# Patient Record
Sex: MALE | Race: BLACK OR AFRICAN AMERICAN | NOT HISPANIC OR LATINO
[De-identification: names, ages, dates, MRNs, and addresses within clinical notes are randomized per-mention and may not be internally consistent; named-entity substitution may affect disease eponyms.]

---

## 2018-09-28 PROBLEM — Z00.00 ENCOUNTER FOR PREVENTIVE HEALTH EXAMINATION: Status: ACTIVE | Noted: 2018-09-28

## 2023-12-12 ENCOUNTER — APPOINTMENT (OUTPATIENT)
Dept: PAIN MANAGEMENT | Facility: CLINIC | Age: 78
End: 2023-12-12
Payer: COMMERCIAL

## 2023-12-12 ENCOUNTER — NON-APPOINTMENT (OUTPATIENT)
Age: 78
End: 2023-12-12

## 2023-12-12 VITALS
HEART RATE: 89 BPM | OXYGEN SATURATION: 99 % | BODY MASS INDEX: 25.05 KG/M2 | HEIGHT: 73 IN | SYSTOLIC BLOOD PRESSURE: 137 MMHG | DIASTOLIC BLOOD PRESSURE: 89 MMHG | WEIGHT: 189 LBS

## 2023-12-12 DIAGNOSIS — Z78.9 OTHER SPECIFIED HEALTH STATUS: ICD-10-CM

## 2023-12-12 DIAGNOSIS — N18.30 CHRONIC KIDNEY DISEASE, STAGE 3 UNSPECIFIED: ICD-10-CM

## 2023-12-12 DIAGNOSIS — E11.9 TYPE 2 DIABETES MELLITUS W/OUT COMPLICATIONS: ICD-10-CM

## 2023-12-12 DIAGNOSIS — I10 ESSENTIAL (PRIMARY) HYPERTENSION: ICD-10-CM

## 2023-12-12 DIAGNOSIS — E78.00 PURE HYPERCHOLESTEROLEMIA, UNSPECIFIED: ICD-10-CM

## 2023-12-12 PROCEDURE — 99204 OFFICE O/P NEW MOD 45 MIN: CPT

## 2023-12-28 ENCOUNTER — APPOINTMENT (OUTPATIENT)
Dept: PAIN MANAGEMENT | Facility: CLINIC | Age: 78
End: 2023-12-28
Payer: COMMERCIAL

## 2023-12-28 VITALS
WEIGHT: 189 LBS | HEIGHT: 73 IN | DIASTOLIC BLOOD PRESSURE: 75 MMHG | HEART RATE: 92 BPM | SYSTOLIC BLOOD PRESSURE: 140 MMHG | RESPIRATION RATE: 16 BRPM | BODY MASS INDEX: 25.05 KG/M2 | OXYGEN SATURATION: 97 %

## 2023-12-28 PROCEDURE — 64484 NJX AA&/STRD TFRM EPI L/S EA: CPT | Mod: 50

## 2023-12-28 PROCEDURE — 64483 NJX AA&/STRD TFRM EPI L/S 1: CPT | Mod: 50

## 2023-12-28 NOTE — PROCEDURE
[FreeTextEntry1] : PROCEDURE: Bilateral L5-S1 transforaminal epidural steroid injection under fluoroscopic guidance.  CHIEF COMPLAINT: Low back and leg pain.    PREOPERATIVE DIAGNOSIS:	Lumbar radiculopathy.  POSTOPERATIVE DIAGNOSIS:  Lumbar radiculopathy. 	  ANESTHESIA:  Local.  COMPLICATIONS:  	None.  ESTIMATED BLOOD LOSS:  None.	  INDICATIONS: Mr. Hopper is a very pleasant 78 yom who has significant low back and leg pain.  The patient has failed to have relief with medication management and physical therapy. Given their failure to improve with all other conservative measures, it was determined that the patient would benefit from a transforaminal epidural steroid injection under fluoroscopic guidance.    The patient denies any fevers, chills, nausea, vomiting, diarrhea, constipation, chest pain, shortness of breath, or burning on urination.  They deny any latex allergy.  They are not taking any anticoagulants and do not have a history of coagulopathy.  The patient denies any IV contrast allergy.       PROCEDURE COURSE: The risks, benefits, and alternatives of the lumbar interlaminar epidural steroid injection were explained to the patient.  All questions were answered to their own satisfaction.  Written consent was signed and placed in the chart. The patients low back was marked in the preoperative holding area.   The patient was brought to the Operating Room and placed in the prone position with a pillow under the abdomen to reduce lumbar lordosis.  A time-out was taken identifying the patient, the procedure, the site and side, and the patients allergies.  ASA standard monitors were applied for monitoring throughout the procedure.  The patients back was prepped and draped with Chloroprep in the usual sterile fashion and sterile technique was adhered to throughout the entire procedure.  The lumbar spine was visualized under fluoroscopy in the AP view. The 12th rib and T12 vertebra were identified as a reference point and the lumbar vertebral bodies were counted.  The L5 vertebral body was then squared. The vertebral body and the superior and inferior end plates were aligned and the spinous processes were adjusted until midline. The fluoroscope was then obliqued approximately twenty degrees to the side of the target foramen and the L5-S1 foramen was identified under fluoroscopic guidance. The skin and subcutaneous tissues were infiltrated with 1% Lidocaine.  After adequate local anesthesia was obtained, a 22g needle was advanced toward the foramen. The needle was carefully advanced, alternating between AP and lateral views, to ensure appropriate trajectory and depth. The needle was advanced just under the pedicle and this was confirmed on lateral view. Once the foramen was accessed, negative aspiration for blood and CSF was obtained, further confirming location. Following this, 1 cc of contrast was administered and epidural spread was confirmed in the AP and lateral views. Following this, 7.5 mg of dexamethasone and 1 cc of 1% lidocaine was slowly administered in incremental amounts.  Following this attention was turned to the right L5-S1 foramen. The same exact sequence of events was followed and the same exact medications administered.   All injections were done with negative aspiration for cerebrospinal fluid or heme, and all needles were withdrawn without incident. I personally met with the patient following the procedure and all questions were answered. The patient tolerated the procedure well without any apparent complications and was transferred to the Recovery Room in stable condition. The patient was provided with discharge instructions and will follow-up with me in my office.  	___________________________________ 	Joshua James M.D.

## 2024-01-09 ENCOUNTER — APPOINTMENT (OUTPATIENT)
Dept: PAIN MANAGEMENT | Facility: CLINIC | Age: 79
End: 2024-01-09
Payer: COMMERCIAL

## 2024-01-09 PROCEDURE — 99214 OFFICE O/P EST MOD 30 MIN: CPT

## 2024-01-10 ENCOUNTER — APPOINTMENT (OUTPATIENT)
Dept: PAIN MANAGEMENT | Facility: CLINIC | Age: 79
End: 2024-01-10

## 2024-02-21 ENCOUNTER — APPOINTMENT (OUTPATIENT)
Dept: PSYCHIATRY | Facility: HOSPITAL | Age: 79
End: 2024-02-21

## 2024-02-21 PROCEDURE — 99202 OFFICE O/P NEW SF 15 MIN: CPT | Mod: 1L,95

## 2024-02-22 NOTE — RISK ASSESSMENT
[Clinical Interview] : Clinical Interview [Clinical Records] : Clinical Records [No] : No [No known suicide factors] : No known suicide factors [Identifies reasons for living] : identifies reasons for living [Supportive social network of family or friends] : supportive social network of family or friends [Alevism beliefs] : Latter-day beliefs [Cultural, spiritual and/or moral attitudes against suicide] : cultural, spiritual and/or moral attitudes against suicide [Ability to cope with stress] : ability to cope with stress [Positive therapeutic relationships] : positive therapeutic relationships [None in the patient's lifetime] : None in the patient's lifetime [No known risk factors] : No known risk factors

## 2024-02-22 NOTE — DISCUSSION/SUMMARY
[FreeTextEntry1] : 78 year old male with past medical history of hypertension, hypercholesterolemia, diabetes mellitus type 2, and chronic pain (lumbar stenosis and radiculopathy c/b failed back surgery syndrome), is referred for pre-SCS BH Eval.   The patient has a good understanding of his chronic pain history as well as what SCS entails. He is cognitively intact and denies any acute or past psychiatric concerns, including any substance use. He also is supported by an extensive family.   As such, he has no absolute psychiatric contraindication to SCS trial.

## 2024-02-22 NOTE — REASON FOR VISIT
[Patient preference] : as per patient preference [Access issues (e.g., transportation, impaired mobility, etc.)] : due to patient's access issues [Home] : The patient, [unfilled], was located at home, [unfilled], at the time of the visit. [Telephone (audio) - Individual/Group] : This telephonic visit was provided via audio only technology. [[facility] Provider/Facility] : [facility] Provider/Facility [Specialty Physician] : Specialty Physician [Patient] : Patient [Prior Medical Records] : Prior Medical Records [FreeTextEntry2] : Dr. James, Pain Mgmt [FreeTextEntry1] : pre SCS BH Eval

## 2024-02-22 NOTE — PHYSICAL EXAM
[FreeTextEntry5] : deferred, virtual format [Cooperative] : cooperative [Euthymic] : euthymic [Clear] : clear [Linear/Goal Directed] : linear/goal directed [Average] : average [WNL] : within normal limits [FreeTextEntry1] : could not visually assess, only telephone [de-identified] : could not visually assess, only telephone [de-identified] : could not visually assess, only telephone [de-identified] : could not visually assess, only telephone

## 2024-02-28 ENCOUNTER — APPOINTMENT (OUTPATIENT)
Dept: PAIN MANAGEMENT | Facility: CLINIC | Age: 79
End: 2024-02-28
Payer: MEDICARE

## 2024-02-28 PROCEDURE — 99214 OFFICE O/P EST MOD 30 MIN: CPT

## 2024-02-28 NOTE — HISTORY OF PRESENT ILLNESS
[FreeTextEntry1] : Verbal consent was given by/on: Fermin Ward on 02/28/24  Patient location: Home  Physician location: Office  Reason for Telehealth visit: Back pain  Patient remains in serious pain. Cannot function in his daily life. Wishes to pursue spinal cord stimulator trial. His surgeon has recommended this. Quality of life is impaired. There has been a severe exacerbation of the patient's chronic pain.  This service took place using a two way audio and visual platform. The patient and Dr. James were both able to see each other and communicate through video. There were no barriers to communication. Greater then 50% of the time spent in the encounter involved counseling and coordination of care.   Time spent on visit: 30 minutes   HPI: 78 yom presents w/ severe back pain. He had surgery in May with Dr. Lombardi. He has severe pain radiating down his bilateral lower extremities. Quality of life is impaired. There has been a severe exacerbation of the patient's chronic pain. He is on Plavix.   Interventions: KEYSHAWN

## 2024-02-28 NOTE — ASSESSMENT
[FreeTextEntry1] : 78 yom presents w/ severe low back and bilateral leg pain. Quality of life is impaired. There has been a severe exacerbation of the patient's chronic pain.   I have personally reviewed the patient's MRI in detail and discussed it with them which is significant for severe spinal stenosis at L4-L5 and L5-S1.  The patient has failed to have relief with medication management. The patient has failed to have relief with more than six weeks of physical therapy within the last three months. Given the patients failure to improve with all other conservative measures, recommend spinal cord stimulator trial under fluoroscopic guidance. The patient will follow-up with me in my office two weeks following intervention.  Patient will need to discuss with his cardiologist whether he can discontinue his Plavix for seven days prior to trial and throughout trial. If so, we will plan for an abbreviated trial of three days.   I have discussed in detail with the patient that an interventional spine procedure is associated with potential risks. The procedure may include an injection of steroid and potentially other medications (local anesthetic and normal saline) into the epidural space or surrounding tissue of the spine. There are significant risks of this procedure which include and are not limited to infection, bleeding, worsening pain, dural puncture leading to post-dural puncture headache, nerve damage, spinal cord injury, paralysis, stroke, and death. There is a chance that the procedure does not improve their pain. There are risks associated with the steroid being absorbed into the body systemically. These include dysphoria, difficulty sleeping, mood swings, and personality changes. Pre-menopausal women may notice a regularity his in her menstrual cycle for 2-3 months following the injection. Steroids can specifically affect patients with hypertension, diabetes, and peptic ulcers. The procedure may cause a temporary increase in blood pressure and blood glucose, and may adversely affect a peptic ulcer. Other, more rare complications, including avascular necrosis of the joints, glaucoma, and osteoporosis. I have discussed the risks of the procedure at length with the patient, and the potential benefits of pain relief. I have offered alternatives to the procedure. All questions were answered. The patient expressed understanding and wishes to proceed with the procedure.  Physical therapy prescribed - goal will be to increase ROM, strengthening, postural training, other modalities ad neri which may include massage and stim. Goals of therapy discussed with the patient in detail and will be discussed with physical therapist. Patient will follow-up following course of physical therapy to monitor progress and adjust therapy as needed.  Acetaminophen 1,000 mg q8h prn for moderate pain. Risks, benefits, and alternatives of acetaminophen discussed with patient.  Ibuprofen 600 mg q8h prn add when pain is not adequately controlled with acetaminophen. Risks, benefits, and alternatives of ibuprofen discussed with patient.  Diet and nutritional strategies discussed which may improve patients pain and will improve overall health.  Needs to stop Plavix seven days prior to intervention.   I explained to patient benefits and limitation of TeleMedicine visits. Patient understands that limitations include inability to perform comprehensive physical exam, which may lead to potential diagnostic inconsistencies.  Patient understands that diagnosis and treatment may be limited by these inconsistencies and patient agrees to proceed with care plan  Patient is scheduled for procedure based on history, imaging and limited physical exam performed on TeleHealth visit.  If necessary, additional focal physical exam will be performed on date of procedure

## 2024-02-28 NOTE — PHYSICAL EXAM
[de-identified] : Constitutional: Well-developed, in no acute distress  Psychiatric: Appropriate mood and affect, oriented to time, place, person, and situation

## 2024-02-28 NOTE — DATA REVIEWED
[FreeTextEntry1] : MRI Lumbar Spine:  L3-L4: moderate canal stenosis, severe bilateral foraminal stenosis. L4-L5: severe spinal canal stenosis and severe bilateral foraminal stenosis. L5-S1:

## 2024-03-05 ENCOUNTER — TRANSCRIPTION ENCOUNTER (OUTPATIENT)
Age: 79
End: 2024-03-05

## 2024-03-05 ENCOUNTER — APPOINTMENT (OUTPATIENT)
Dept: PAIN MANAGEMENT | Facility: HOSPITAL | Age: 79
End: 2024-03-05

## 2024-03-05 RX ORDER — CEPHALEXIN 250 MG/1
250 TABLET ORAL
Qty: 14 | Refills: 0 | Status: ACTIVE | COMMUNITY
Start: 2024-03-05 | End: 1900-01-01

## 2024-03-08 ENCOUNTER — APPOINTMENT (OUTPATIENT)
Dept: PAIN MANAGEMENT | Facility: CLINIC | Age: 79
End: 2024-03-08
Payer: COMMERCIAL

## 2024-03-08 VITALS
DIASTOLIC BLOOD PRESSURE: 79 MMHG | SYSTOLIC BLOOD PRESSURE: 143 MMHG | WEIGHT: 189 LBS | HEART RATE: 88 BPM | BODY MASS INDEX: 25.05 KG/M2 | HEIGHT: 73 IN | OXYGEN SATURATION: 97 %

## 2024-03-08 PROCEDURE — 99024 POSTOP FOLLOW-UP VISIT: CPT

## 2024-03-08 NOTE — ASSESSMENT
[FreeTextEntry1] : 78 yom presents w/ severe low back and bilateral leg pain here s/p SCS trial for lead removal. He had excellent relief with trial, as detailed above.  I have personally reviewed the patient's MRI in detail and discussed it with them which is significant for severe spinal stenosis at L4-L5 and L5-S1.  The patient has failed to have relief with medication management. The patient has failed to have relief with more than six weeks of physical therapy within the last three months. Given the patients failure to improve with all other conservative measures, and excellent relief with SCS trial, recommend spinal cord stimulator implant under fluoroscopic guidance. The patient will follow-up with me in my office two weeks following intervention.  Patient will need to discuss with his cardiologist whether he can discontinue his Plavix for seven days prior to implant. Will obtain cardiology clearance.  I have discussed in detail with the patient that an interventional spine procedure is associated with potential risks. The procedure may include an injection of steroid and potentially other medications (local anesthetic and normal saline) into the epidural space or surrounding tissue of the spine. There are significant risks of this procedure which include and are not limited to infection, bleeding, worsening pain, dural puncture leading to post-dural puncture headache, nerve damage, spinal cord injury, paralysis, stroke, and death. There is a chance that the procedure does not improve their pain. There are risks associated with the steroid being absorbed into the body systemically. These include dysphoria, difficulty sleeping, mood swings, and personality changes. Pre-menopausal women may notice a regularity his in her menstrual cycle for 2-3 months following the injection. Steroids can specifically affect patients with hypertension, diabetes, and peptic ulcers. The procedure may cause a temporary increase in blood pressure and blood glucose, and may adversely affect a peptic ulcer. Other, more rare complications, including avascular necrosis of the joints, glaucoma, and osteoporosis. I have discussed the risks of the procedure at length with the patient, and the potential benefits of pain relief. I have offered alternatives to the procedure. All questions were answered. The patient expressed understanding and wishes to proceed with the procedure.  Physical therapy prescribed - goal will be to increase ROM, strengthening, postural training, other modalities ad neri which may include massage and stim. Goals of therapy discussed with the patient in detail and will be discussed with physical therapist. Patient will follow-up following course of physical therapy to monitor progress and adjust therapy as needed.  Acetaminophen 1,000 mg q8h prn for moderate pain. Risks, benefits, and alternatives of acetaminophen discussed with patient.  Ibuprofen 600 mg q8h prn add when pain is not adequately controlled with acetaminophen. Risks, benefits, and alternatives of ibuprofen discussed with patient.  Diet and nutritional strategies discussed which may improve patients pain and will improve overall health.  Needs to stop Plavix seven days prior to intervention.

## 2024-03-08 NOTE — PHYSICAL EXAM
[de-identified] : Constitutional: Well-developed, in no acute distress  Site: clean, dry, and intact  Psychiatric: Appropriate mood and affect, oriented to time, place, person, and situation

## 2024-03-29 ENCOUNTER — RESULT REVIEW (OUTPATIENT)
Age: 79
End: 2024-03-29

## 2024-04-19 ENCOUNTER — APPOINTMENT (OUTPATIENT)
Dept: PAIN MANAGEMENT | Facility: HOSPITAL | Age: 79
End: 2024-04-19

## 2024-04-19 ENCOUNTER — TRANSCRIPTION ENCOUNTER (OUTPATIENT)
Age: 79
End: 2024-04-19

## 2024-04-19 RX ORDER — CEPHALEXIN 250 MG/1
250 TABLET ORAL
Qty: 14 | Refills: 0 | Status: ACTIVE | COMMUNITY
Start: 2024-04-19 | End: 1900-01-01

## 2024-04-19 RX ORDER — OXYCODONE AND ACETAMINOPHEN 5; 325 MG/1; MG/1
5-325 TABLET ORAL EVERY 6 HOURS
Qty: 9 | Refills: 0 | Status: ACTIVE | COMMUNITY
Start: 2024-04-19 | End: 1900-01-01

## 2024-04-23 ENCOUNTER — APPOINTMENT (OUTPATIENT)
Dept: PAIN MANAGEMENT | Facility: CLINIC | Age: 79
End: 2024-04-23
Payer: COMMERCIAL

## 2024-04-23 VITALS
OXYGEN SATURATION: 98 % | HEART RATE: 86 BPM | HEIGHT: 73 IN | SYSTOLIC BLOOD PRESSURE: 154 MMHG | WEIGHT: 189 LBS | DIASTOLIC BLOOD PRESSURE: 84 MMHG | BODY MASS INDEX: 25.05 KG/M2

## 2024-04-23 PROCEDURE — 99024 POSTOP FOLLOW-UP VISIT: CPT

## 2024-04-25 NOTE — PHYSICAL EXAM
[de-identified] : Constitutional: Well-developed, in no acute distress  Site: clean, dry, and intact  Psychiatric: Appropriate mood and affect, oriented to time, place, person, and situation

## 2024-04-25 NOTE — ASSESSMENT
[FreeTextEntry1] : 78 yom doing very well after SCS implant.  No pain, no signs of any infection.  Hematoma has decreased substantially.  Continue prophylactic antibiotics.  RTC one week.

## 2024-04-25 NOTE — HISTORY OF PRESENT ILLNESS
[FreeTextEntry1] : Interval History: Pt returns today s/p SCS implant. Post implant he did have some hematoma formation around the IPG pocket, that has declined. Patient has significant pain relief and is happy with implant. On prophylactic antibiotics.   HPI: 78 yom presents w/ severe back pain. He had surgery in May with Dr. Lombardi. He has severe pain radiating down his bilateral lower extremities. Quality of life is impaired. There has been a severe exacerbation of the patient's chronic pain. He is on Plavix.   Interventions: KEYSHAWN

## 2024-05-10 ENCOUNTER — APPOINTMENT (OUTPATIENT)
Dept: PAIN MANAGEMENT | Facility: CLINIC | Age: 79
End: 2024-05-10
Payer: COMMERCIAL

## 2024-05-10 VITALS
OXYGEN SATURATION: 97 % | HEIGHT: 73 IN | HEART RATE: 91 BPM | WEIGHT: 189 LBS | SYSTOLIC BLOOD PRESSURE: 110 MMHG | BODY MASS INDEX: 25.05 KG/M2 | DIASTOLIC BLOOD PRESSURE: 67 MMHG

## 2024-05-10 DIAGNOSIS — M54.16 RADICULOPATHY, LUMBAR REGION: ICD-10-CM

## 2024-05-10 DIAGNOSIS — M96.1 POSTLAMINECTOMY SYNDROME, NOT ELSEWHERE CLASSIFIED: ICD-10-CM

## 2024-05-10 DIAGNOSIS — M79.10 MYALGIA, UNSPECIFIED SITE: ICD-10-CM

## 2024-05-10 DIAGNOSIS — M47.817 SPONDYLOSIS W/OUT MYELOPATHY OR RADICULOPATHY, LUMBOSACRAL REGION: ICD-10-CM

## 2024-05-10 PROCEDURE — 99213 OFFICE O/P EST LOW 20 MIN: CPT

## 2024-05-10 RX ORDER — METHOCARBAMOL 750 MG/1
750 TABLET, FILM COATED ORAL 3 TIMES DAILY
Qty: 90 | Refills: 0 | Status: ACTIVE | COMMUNITY
Start: 2024-05-10 | End: 1900-01-01

## 2024-05-13 PROBLEM — M47.817 LUMBOSACRAL SPONDYLOSIS WITHOUT MYELOPATHY: Status: ACTIVE | Noted: 2023-12-12

## 2024-05-13 PROBLEM — M79.10 MYALGIA: Status: ACTIVE | Noted: 2023-12-12

## 2024-05-13 PROBLEM — M54.16 LUMBAR RADICULOPATHY, CHRONIC: Status: ACTIVE | Noted: 2023-12-12

## 2024-05-13 PROBLEM — M96.1 FAILED BACK SURGICAL SYNDROME: Status: ACTIVE | Noted: 2023-12-12

## 2024-05-13 NOTE — HISTORY OF PRESENT ILLNESS
[FreeTextEntry1] : Interval History: Pt returns today s/p SCS implant. Post implant he did have some hematoma formation around the IPG pocket which has resolved. He has only minor pain at this time. Very happy with SCS implant.    HPI: 78 yom presents w/ severe back pain. He had surgery in May with Dr. Lombardi. He has severe pain radiating down his bilateral lower extremities. Quality of life is impaired. There has been a severe exacerbation of the patient's chronic pain. He is on Plavix.   Interventions: SCS implant: SCS trial: KEYSHAWN

## 2024-05-13 NOTE — PHYSICAL EXAM
[de-identified] : Constitutional: Well-developed, in no acute distress  Site: clean, dry, and intact  Psychiatric: Appropriate mood and affect, oriented to time, place, person, and situation

## 2024-05-13 NOTE — ASSESSMENT
[FreeTextEntry1] : 78 yom doing very well after SCS implant.  No pain, no signs of any infection.  Hematoma has decreased substantially.  Physical therapy prescribed - goal will be to increase ROM, strengthening, postural training, other modalities ad neri which may include massage and stim. Goals of therapy discussed with the patient in detail and will be discussed with physical therapist. Patient will follow-up following course of physical therapy to monitor progress and adjust therapy as needed.  Acetaminophen 1,000 mg q8h prn for moderate pain. Risks, benefits, and alternatives of acetaminophen discussed with patient.  Diet and nutritional strategies discussed which may improve patients pain and will improve overall health.